# Patient Record
Sex: FEMALE | Race: OTHER | URBAN - METROPOLITAN AREA
[De-identification: names, ages, dates, MRNs, and addresses within clinical notes are randomized per-mention and may not be internally consistent; named-entity substitution may affect disease eponyms.]

---

## 2017-01-17 ENCOUNTER — TELEPHONE (OUTPATIENT)
Dept: FAMILY MEDICINE CLINIC | Age: 27
End: 2017-01-17

## 2017-01-17 NOTE — TELEPHONE ENCOUNTER
Patient called and states she had a pap while in [place] where she lived at the time and the pap came back positive. Patient need to have a colposcopy done this week, as she will be going out of country to [place] and will not return for another 2 years. Patient states she will be getting the results to bring with her for procedure. Explained to patient per nurse Jeison Diaz. That results need to be reviewed prior to scheduling of appointment. Patient states she need appointment.     Call [phone]

## 2017-01-18 ENCOUNTER — OFFICE VISIT (OUTPATIENT)
Dept: FAMILY MEDICINE CLINIC | Age: 27
End: 2017-01-18

## 2017-01-23 PROBLEM — R87.610 ASCUS WITH POSITIVE HIGH RISK HPV CERVICAL: Status: ACTIVE | Noted: 2017-01-23

## 2017-01-23 PROBLEM — R87.810 ASCUS WITH POSITIVE HIGH RISK HPV CERVICAL: Status: ACTIVE | Noted: 2017-01-23
